# Patient Record
(demographics unavailable — no encounter records)

---

## 2024-10-17 NOTE — HISTORY OF PRESENT ILLNESS
[FreeTextEntry1] : Susan is a 40-year-old female who presents here for follow up.  She was last seen in 2/2024 as part of presurgical cardiac evaluation for the Insertion of IR Spinal Cord Stimulator to be performed on 3/5/24 at [facility] with Dr Paniagua.  She has a history of type 1 diabetes with an insulin pump, with significant neuropathy and balance issues. She does have a history of hyperlipidemia, and is currently on atorvastatin.   Susan ambulates with a cane. She also has a history of inflammatory arthritis, and is on a number of medications for pain, and follows with a rheumatologist. She does have a family history of CVA and DM2, and her father passed away at a young age.  There is no history of premature CAD, bypass surgery, or stents.  She denies toxic habits.  On 10/11/2022, she presented to the emergency room with chest tightness, along with hyperglycemia.  Evaluation, including blood work was unremarkable. On 12/6/23, she was getting out of bed and had a syncopal episode and hit her head and back. She was admitted to the hospital with DKA. She remained in the hospital for several days at that time.  She had major brain surgery in 3/23 for trigeminal neuralgia. She was in the hospital for 12 days.   I last saw her in 7/24. Overall, she is doing well. She is trying to be more active. She is s/p spinal stimulator. She has chronic mild dyspnea. Echo in 2024 with normal LV function. Pharm stress without ischemia in 2/24. When I last saw her in 4/24, I added Toprol XL 25, though she is not taking it. She felt like a zombie on this. Her insurance would not cover an extended holter. Her average HR (resting) on an apple watch over the last 6 months was <100.

## 2024-10-17 NOTE — PHYSICAL EXAM
[Well Developed] : well developed [Well Nourished] : well nourished [No Acute Distress] : no acute distress [Normal Conjunctiva] : normal conjunctiva [Normal Venous Pressure] : normal venous pressure [No Carotid Bruit] : no carotid bruit [Normal S1, S2] : normal S1, S2 [No Murmur] : no murmur [No Rub] : no rub [No Gallop] : no gallop [Normal S1] : normal S1 [Normal S2] : normal S2 [No Pitting Edema] : no pitting edema present [No Abnormalities] : the abdominal aorta was not enlarged and no bruit was heard [Clear Lung Fields] : clear lung fields [Good Air Entry] : good air entry [No Respiratory Distress] : no respiratory distress  [Soft] : abdomen soft [Non Tender] : non-tender [No Masses/organomegaly] : no masses/organomegaly [Normal Bowel Sounds] : normal bowel sounds [No Edema] : no edema [No Cyanosis] : no cyanosis [No Clubbing] : no clubbing [No Varicosities] : no varicosities [No Rash] : no rash [No Skin Lesions] : no skin lesions [Moves all extremities] : moves all extremities [No Focal Deficits] : no focal deficits [Normal Speech] : normal speech [Alert and Oriented] : alert and oriented [Normal memory] : normal memory [de-identified] : Walks with a cane and has balance issues

## 2024-10-17 NOTE — REASON FOR VISIT
[Symptom and Test Evaluation] : symptom and test evaluation [CV Risk Factors and Non-Cardiac Disease] : CV risk factors and non-cardiac disease [Hyperlipidemia] : hyperlipidemia [Other: ____] : [unfilled] [Parent] : parent

## 2024-10-17 NOTE — REVIEW OF SYSTEMS
[Headache] : headache [SOB] : shortness of breath [Dyspnea on exertion] : dyspnea during exertion [Chest Discomfort] : no chest discomfort [Lower Ext Edema] : no extremity edema [Leg Claudication] : no intermittent leg claudication [Orthopnea] : no orthopnea [PND] : no PND [Myalgia] : myalgia [Negative] : Heme/Lymph [FreeTextEntry9] : Walks with a cane

## 2024-10-17 NOTE — DISCUSSION/SUMMARY
[FreeTextEntry1] : Susan returns for a follow-up visit.  From a cardiovascular standpoint, she is doing okay.  Her palpitations are basically unchanged, though she has been more active.  Her echocardiogram demonstrated normal LV function in 2024. A pharmacologic stress test showed no ischemia in 2024.  Her HR and BP are better today. She will try taking Toprol XL 12.5 at night, for the palpitations. If no improvement, we may try a different BB like propranolol.  She will continue atorvastatin 20mg for lipid management. Goal LDL <70. She promises to be more active and limit her salt intake. She will maintain her water intake.  I will see her again in 3-4 months.   [EKG obtained to assist in diagnosis and management of assessed problem(s)] : EKG obtained to assist in diagnosis and management of assessed problem(s)

## 2024-10-17 NOTE — CARDIOLOGY SUMMARY
[de-identified] : 2/2/23 Sinus with low voltage in the precordial leads and diffuse T wave flattening 10/5/23 Sinus with low voltage in the precordial leads and diffuse T 2/9/24 sinus tachycardia [de-identified] : 7/2023 24 hr holter avg  [de-identified] : 7/2023 LVEF 60-65% trace MR, trace TR

## 2025-01-15 NOTE — CARDIOLOGY SUMMARY
[de-identified] : 2/2/23 Sinus with low voltage in the precordial leads and diffuse T wave flattening 10/5/23 Sinus with low voltage in the precordial leads and diffuse T 2/9/24 sinus tachycardia 1/2025: sinus tachycardia [de-identified] : 7/2023 24 hr holter avg  [de-identified] : 2/2024: normal perfusion [de-identified] : 7/2024: NML LV/RV, trace MR, LVEF 62% 7/2023 LVEF 60-65% trace MR, trace TR

## 2025-01-15 NOTE — PHYSICAL EXAM
[Well Developed] : well developed [Well Nourished] : well nourished [No Acute Distress] : no acute distress [Normal Conjunctiva] : normal conjunctiva [Normal Venous Pressure] : normal venous pressure [No Carotid Bruit] : no carotid bruit [Normal S1, S2] : normal S1, S2 [No Murmur] : no murmur [No Rub] : no rub [No Gallop] : no gallop [Normal S1] : normal S1 [Normal S2] : normal S2 [No Pitting Edema] : no pitting edema present [No Abnormalities] : the abdominal aorta was not enlarged and no bruit was heard [Clear Lung Fields] : clear lung fields [Good Air Entry] : good air entry [No Respiratory Distress] : no respiratory distress  [Soft] : abdomen soft [Non Tender] : non-tender [No Masses/organomegaly] : no masses/organomegaly [Normal Bowel Sounds] : normal bowel sounds [No Edema] : no edema [No Cyanosis] : no cyanosis [No Clubbing] : no clubbing [No Varicosities] : no varicosities [No Rash] : no rash [No Skin Lesions] : no skin lesions [Moves all extremities] : moves all extremities [No Focal Deficits] : no focal deficits [Normal Speech] : normal speech [Alert and Oriented] : alert and oriented [Normal memory] : normal memory [de-identified] : Walks with a cane and has balance issues

## 2025-01-15 NOTE — HISTORY OF PRESENT ILLNESS
[FreeTextEntry1] : Susan is a 41-year-old female who presents here for follow up.  She was last seen in 2/2024 as part of presurgical cardiac evaluation for the Insertion of IR Spinal Cord Stimulator to be performed on 3/5/24 at [facility] with Dr Paniagua.  She has a history of type 1 diabetes with an insulin pump, with significant neuropathy and balance issues. She does have a history of hyperlipidemia, and is currently on atorvastatin.   Susan ambulates with a cane. She also has a history of inflammatory arthritis, and is on a number of medications for pain, and follows with a rheumatologist. She does have a family history of CVA and DM2, and her father passed away at a young age.  There is no history of premature CAD, bypass surgery, or stents.  She denies toxic habits.  On 10/11/2022, she presented to the emergency room with chest tightness, along with hyperglycemia.  Evaluation, including blood work was unremarkable. On 12/6/23, she was getting out of bed and had a syncopal episode and hit her head and back. She was admitted to the hospital with DKA. She remained in the hospital for several days at that time.  She had major brain surgery in 3/23 for trigeminal neuralgia. She was in the hospital for 12 days.   She is s/p spinal stimulator. She has chronic mild dyspnea. Echo in 2024 with normal LV function. Pharm stress without ischemia in 2/24.  When I last saw her in 4/24, I added Toprol XL 25, though she is not taking it. She felt like a zombie on this. Her insurance would not cover an extended holter. Her average HR (resting) on an apple watch over the last 6 months was <100.  She arrives today after a visit to the ED with an asthma exacerbation.  She reports sitting in [employer] that day with sudden onset of cough and wheezing associated with SOB and chest tightness. Hospital workup including CXR, DDimer and Troponins and other B/W were overall unremarkable.  rhythm monitoring demonstrated sinus tachycardia. She was given nebulizer and steroids.  She reports feeling better since then without recurrence. she just completed a course of steroids and nebulizers.  she has been walking better with the spinal stimulator, Neuropathies have improved.

## 2025-01-15 NOTE — HISTORY OF PRESENT ILLNESS
[FreeTextEntry1] : Susan is a 41-year-old female who presents here for follow up.  She was last seen in 2/2024 as part of presurgical cardiac evaluation for the Insertion of IR Spinal Cord Stimulator to be performed on 3/5/24 at [facility] with Dr Paniagua.  She has a history of type 1 diabetes with an insulin pump, with significant neuropathy and balance issues. She does have a history of hyperlipidemia, and is currently on atorvastatin.   Susan ambulates with a cane. She also has a history of inflammatory arthritis, and is on a number of medications for pain, and follows with a rheumatologist. She does have a family history of CVA and DM2, and her father passed away at a young age.  There is no history of premature CAD, bypass surgery, or stents.  She denies toxic habits.  On 10/11/2022, she presented to the emergency room with chest tightness, along with hyperglycemia.  Evaluation, including blood work was unremarkable. On 12/6/23, she was getting out of bed and had a syncopal episode and hit her head and back. She was admitted to the hospital with DKA. She remained in the hospital for several days at that time.  She had major brain surgery in 3/23 for trigeminal neuralgia. She was in the hospital for 12 days.   She is s/p spinal stimulator. She has chronic mild dyspnea. Echo in 2024 with normal LV function. Pharm stress without ischemia in 2/24.  When I last saw her in 4/24, I added Toprol XL 25, though she is not taking it. She felt like a zombie on this. Her insurance would not cover an extended holter. Her average HR (resting) on an apple watch over the last 6 months was <100.  She arrives today after a visit to the ED with an asthma exacerbation.  She reports sitting in [employer] that day with sudden onset of cough and wheezing associated with SOB and chest tightness. Hospital workup including CXR, DDimer and Troponins and other B/W were overall unremarkable.  rhythm monitoring demonstrated sinus tachycardia. She was given nebulizer and steroids.  She reports feeling better since then without recurrence. she just completed a course of steroids and nebulizers.  she has been walking better with the spinal stimulator, Neuropathies have improved.

## 2025-01-15 NOTE — CARDIOLOGY SUMMARY
[de-identified] : 2/2/23 Sinus with low voltage in the precordial leads and diffuse T wave flattening 10/5/23 Sinus with low voltage in the precordial leads and diffuse T 2/9/24 sinus tachycardia 1/2025: sinus tachycardia [de-identified] : 7/2023 24 hr holter avg  [de-identified] : 2/2024: normal perfusion [de-identified] : 7/2024: NML LV/RV, trace MR, LVEF 62% 7/2023 LVEF 60-65% trace MR, trace TR Propranolol Pregnancy And Lactation Text: This medication is Pregnancy Category C and it isn't known if it is safe during pregnancy. It is excreted in breast milk.

## 2025-01-15 NOTE — REVIEW OF SYSTEMS
[Myalgia] : myalgia [Negative] : Heme/Lymph [Palpitations] : palpitations [SOB] : no shortness of breath [Dyspnea on exertion] : not dyspnea during exertion [Chest Discomfort] : no chest discomfort [Lower Ext Edema] : no extremity edema [Leg Claudication] : no intermittent leg claudication [Orthopnea] : no orthopnea [PND] : no PND [Syncope] : no syncope [FreeTextEntry9] : Walks with a cane

## 2025-01-15 NOTE — DISCUSSION/SUMMARY
[EKG obtained to assist in diagnosis and management of assessed problem(s)] : EKG obtained to assist in diagnosis and management of assessed problem(s) [FreeTextEntry1] : Susan returns for a follow-up visit after an ED visit.  I have reviewed all of the medical records available to me at this time from her admission at length, including consultations, laboratory records, radiologic records, medication administration records and discharge summary. She was recently seen in the ED for an asthma exacerbation which she has recovered from. She arrives today in no acute distress, saturation of 99% on RA.  She is not wheezing on exam.  ECG illustrates sinus tachycardia which is her baseline. Her B/P is controlled. Because she is not wheezing and asthma is controlled at this time, she will remain on toprol 50mg daily for HR control and symptomatic palpitations.  Her echocardiogram demonstrated normal LV function in 2024. A pharmacologic stress test showed no ischemia in 2024.  She will continue atorvastatin 20mg for lipid management. Goal LDL <70. She will have updated fasting lipid panel at her next appointment with endocrinology. She promises to be more active and limit her salt intake. She will maintain her water intake.  I will see her again in 4 months.

## 2025-01-15 NOTE — ADDENDUM
[FreeTextEntry1] : recent admission with asthma exacerbation and CP feeling better overall normal cardiac testing in last 2024 to cont high dose of bb and fu with pulm

## 2025-01-15 NOTE — PHYSICAL EXAM
[Well Developed] : well developed [Well Nourished] : well nourished [No Acute Distress] : no acute distress [Normal Conjunctiva] : normal conjunctiva [Normal Venous Pressure] : normal venous pressure [No Carotid Bruit] : no carotid bruit [Normal S1, S2] : normal S1, S2 [No Murmur] : no murmur [No Rub] : no rub [No Gallop] : no gallop [Normal S1] : normal S1 [Normal S2] : normal S2 [No Pitting Edema] : no pitting edema present [No Abnormalities] : the abdominal aorta was not enlarged and no bruit was heard [Clear Lung Fields] : clear lung fields [Good Air Entry] : good air entry [No Respiratory Distress] : no respiratory distress  [Soft] : abdomen soft [Non Tender] : non-tender [No Masses/organomegaly] : no masses/organomegaly [Normal Bowel Sounds] : normal bowel sounds [No Edema] : no edema [No Cyanosis] : no cyanosis [No Clubbing] : no clubbing [No Varicosities] : no varicosities [No Rash] : no rash [No Skin Lesions] : no skin lesions [Moves all extremities] : moves all extremities [No Focal Deficits] : no focal deficits [Normal Speech] : normal speech [Alert and Oriented] : alert and oriented [Normal memory] : normal memory [de-identified] : Walks with a cane and has balance issues

## 2025-02-28 NOTE — REVIEW OF SYSTEMS
[Palpitations] : palpitations [Myalgia] : myalgia [Negative] : Heme/Lymph [SOB] : no shortness of breath [Dyspnea on exertion] : not dyspnea during exertion [Chest Discomfort] : no chest discomfort [Lower Ext Edema] : no extremity edema [Leg Claudication] : no intermittent leg claudication [Orthopnea] : no orthopnea [PND] : no PND [Syncope] : no syncope [FreeTextEntry9] : Walks with a cane

## 2025-02-28 NOTE — PHYSICAL EXAM
[Well Developed] : well developed [Well Nourished] : well nourished [No Acute Distress] : no acute distress [Normal Conjunctiva] : normal conjunctiva [Normal Venous Pressure] : normal venous pressure [No Carotid Bruit] : no carotid bruit [Normal S1, S2] : normal S1, S2 [No Murmur] : no murmur [No Rub] : no rub [No Gallop] : no gallop [Normal S1] : normal S1 [Normal S2] : normal S2 [No Pitting Edema] : no pitting edema present [No Abnormalities] : the abdominal aorta was not enlarged and no bruit was heard [Clear Lung Fields] : clear lung fields [Good Air Entry] : good air entry [No Respiratory Distress] : no respiratory distress  [Soft] : abdomen soft [Non Tender] : non-tender [No Masses/organomegaly] : no masses/organomegaly [Normal Bowel Sounds] : normal bowel sounds [No Edema] : no edema [No Cyanosis] : no cyanosis [No Clubbing] : no clubbing [No Varicosities] : no varicosities [No Rash] : no rash [No Skin Lesions] : no skin lesions [Moves all extremities] : moves all extremities [No Focal Deficits] : no focal deficits [Normal Speech] : normal speech [Alert and Oriented] : alert and oriented [Normal memory] : normal memory [de-identified] : Walks with a cane and has balance issues

## 2025-02-28 NOTE — DISCUSSION/SUMMARY
[EKG obtained to assist in diagnosis and management of assessed problem(s)] : EKG obtained to assist in diagnosis and management of assessed problem(s) [FreeTextEntry1] : Overall, Susan is doing ok. She does report palpitations, and fast heart rates. I think her heart rates are all reactive in the setting of uncontrolled diabetes, medications (nebulizers) and weight. Her resting HR is 99 bpm today. She is not wheezing on exam. Her BP is at goal.  Her echocardiogram demonstrated normal LV function in 2024. A pharmacologic stress test showed no ischemia in 2024. She will remain on Toprol XL 50 (and can take 25 in the AM).  She will continue atorvastatin 20 mg for lipid management.  She promises to be more active and limit her salt intake. She will maintain her water intake.  I will see her again in 4 months.

## 2025-02-28 NOTE — CARDIOLOGY SUMMARY
[de-identified] : 2/2/23 Sinus with low voltage in the precordial leads and diffuse T wave flattening 10/5/23 Sinus with low voltage in the precordial leads and diffuse T 2/9/24 sinus tachycardia 1/2025: sinus tachycardia [de-identified] : 7/2023 24 hr holter avg  [de-identified] : 2/2024: normal perfusion [de-identified] : 7/2024: NML LV/RV, trace MR, LVEF 62% 7/2023 LVEF 60-65% trace MR, trace TR

## 2025-02-28 NOTE — HISTORY OF PRESENT ILLNESS
[FreeTextEntry1] : Susan is a 41-year-old female who presents here for follow up.  She was last seen in 2/2024 as part of presurgical cardiac evaluation for the Insertion of IR Spinal Cord Stimulator to be performed on 3/5/24 at [facility] with Dr Paniagua.  She has a history of type 1 diabetes with an insulin pump, with significant neuropathy and balance issues. She does have a history of hyperlipidemia, and is currently on atorvastatin.   Susan ambulates with a cane. She also has a history of inflammatory arthritis, and is on a number of medications for pain, and follows with a rheumatologist. She does have a family history of CVA and DM2, and her father passed away at a young age.  There is no history of premature CAD, bypass surgery, or stents.  She denies toxic habits.  On 10/11/2022, she presented to the emergency room with chest tightness, along with hyperglycemia.  Evaluation, including blood work was unremarkable. On 12/6/23, she was getting out of bed and had a syncopal episode and hit her head and back. She was admitted to the hospital with DKA. She remained in the hospital for several days at that time.  She had major brain surgery in 3/23 for trigeminal neuralgia. She was in the hospital for 12 days.   She is s/p spinal stimulator. She has chronic mild dyspnea. Echo in 2024 with normal LV function. Pharm stress without ischemia in 2/24.  In 4/24, I added Toprol XL 25, though she was not taking it. She felt like a zombie on this. Her insurance would not cover an extended holter. Her average HR (resting) on an apple watch over the last 6 months was <100.  In 1/24, she went to the ER with asthma exacerbation.  She reports sitting in [employer] that day with sudden onset of cough and wheezing associated with SOB and chest tightness. Hospital workup including CXR, D-Dimer and Troponins and other B/W were overall unremarkable.  rhythm monitoring demonstrated sinus tachycardia. She was given nebulizer and steroids.  I last saw her in 1/25. She reports fast heart rates and palpitations. she has been on 3 course of steroids over the last few months. She is taking nebulizers only as needed (about 1x week). Her a1c is 9.

## 2025-05-16 NOTE — HISTORY OF PRESENT ILLNESS
[FreeTextEntry1] : Susan is a 41-year-old female who presents here for follow up.  She was last seen in 2/2024 as part of presurgical cardiac evaluation for the Insertion of IR Spinal Cord Stimulator to be performed on 3/5/24 at [facility] [place] with Dr Paniagua.  She has a history of type 1 diabetes with an insulin pump, with significant neuropathy and balance issues. She does have a history of hyperlipidemia, and is currently on atorvastatin.   Susan ambulates with a cane. She also has a history of inflammatory arthritis, and is on a number of medications for pain, and follows with a rheumatologist. She does have a family history of CVA and DM2, and her father passed away at a young age.  There is no history of premature CAD, bypass surgery, or stents.  She denies toxic habits.  On 10/11/2022, she presented to the emergency room with chest tightness, along with hyperglycemia.  Evaluation, including blood work was unremarkable. On 12/6/23, she was getting out of bed and had a syncopal episode and hit her head and back. She was admitted to the hospital with DKA. She remained in the hospital for several days at that time.  She had major brain surgery in 3/23 for trigeminal neuralgia. She was in the hospital for 12 days.   She is s/p spinal stimulator. She has chronic mild dyspnea. Echo in 2024 with normal LV function. Pharm stress without ischemia in 2/24.  In 4/24, I added Toprol XL 25, though she was not taking it. She felt like a zombie on this. Her insurance would not cover an extended holter. Her average HR (resting) on an apple watch over the last 6 months was <100.  In 1/24, she went to the ER with asthma exacerbation.  She reports sitting in [employer] that day with sudden onset of cough and wheezing associated with SOB and chest tightness. Hospital workup including CXR, D-Dimer and Troponins and other B/W were overall unremarkable.  rhythm monitoring demonstrated sinus tachycardia. She was given nebulizer and steroids.  I last saw her in 2/25. Her heart rates have quieted down. She is taking nebulizers only as needed, which is rare. She last used it during a viral illness in 4/2025. Her a1c was 9, though she did see her endo yesterday for clearance. She needs cardiac clearance prior to robotic gastrojejunostomy.

## 2025-05-16 NOTE — PHYSICAL EXAM
[Well Developed] : well developed [Well Nourished] : well nourished [No Acute Distress] : no acute distress [Normal Conjunctiva] : normal conjunctiva [Normal Venous Pressure] : normal venous pressure [No Carotid Bruit] : no carotid bruit [Normal S1, S2] : normal S1, S2 [No Murmur] : no murmur [No Rub] : no rub [No Gallop] : no gallop [Normal S1] : normal S1 [Normal S2] : normal S2 [No Pitting Edema] : no pitting edema present [No Abnormalities] : the abdominal aorta was not enlarged and no bruit was heard [Clear Lung Fields] : clear lung fields [Good Air Entry] : good air entry [No Respiratory Distress] : no respiratory distress  [Soft] : abdomen soft [Non Tender] : non-tender [No Masses/organomegaly] : no masses/organomegaly [Normal Bowel Sounds] : normal bowel sounds [No Edema] : no edema [No Cyanosis] : no cyanosis [No Clubbing] : no clubbing [No Varicosities] : no varicosities [No Rash] : no rash [No Skin Lesions] : no skin lesions [Moves all extremities] : moves all extremities [No Focal Deficits] : no focal deficits [Normal Speech] : normal speech [Alert and Oriented] : alert and oriented [Normal memory] : normal memory [de-identified] : Walks with a cane and has balance issues

## 2025-05-16 NOTE — CARDIOLOGY SUMMARY
[de-identified] : 2/2/23 Sinus with low voltage in the precordial leads and diffuse T wave flattening 10/5/23 Sinus with low voltage in the precordial leads and diffuse T 2/9/24 sinus tachycardia 1/2025: sinus tachycardia [de-identified] : 7/2023 24 hr holter avg  [de-identified] : 2/2024: normal perfusion [de-identified] : 7/2024: NML LV/RV, trace MR, LVEF 62% 7/2023 LVEF 60-65% trace MR, trace TR

## 2025-05-16 NOTE — DISCUSSION/SUMMARY
[FreeTextEntry1] : Overall, Susan is doing well. Her palpitations and fast heart rates have improved.  Her resting HR is 99 bpm today. She is not wheezing on exam. Her BP is at goal.  Her echocardiogram demonstrated normal LV function in 2024. A pharmacologic stress test showed no ischemia in 2024. She will remain on Toprol XL 50. She will continue atorvastatin 20 mg for lipid management.  She promises to be more active and limit her salt intake. She will maintain her water intake.  There is no cardiac contraindication to proceeding with robotic gastrojejunostomy. Routine cardiac monitoring is recommended.   I will see her again in 4-6 months.  [EKG obtained to assist in diagnosis and management of assessed problem(s)] : EKG obtained to assist in diagnosis and management of assessed problem(s)

## 2025-05-16 NOTE — REVIEW OF SYSTEMS
[SOB] : no shortness of breath [Dyspnea on exertion] : not dyspnea during exertion [Chest Discomfort] : no chest discomfort [Lower Ext Edema] : no extremity edema [Leg Claudication] : no intermittent leg claudication [Palpitations] : palpitations [Orthopnea] : no orthopnea [PND] : no PND [Syncope] : no syncope [Myalgia] : myalgia [Negative] : Heme/Lymph [FreeTextEntry9] : Walks with a cane